# Patient Record
Sex: MALE | URBAN - METROPOLITAN AREA
[De-identification: names, ages, dates, MRNs, and addresses within clinical notes are randomized per-mention and may not be internally consistent; named-entity substitution may affect disease eponyms.]

---

## 2023-12-18 ENCOUNTER — PHARMACY VISIT (OUTPATIENT)
Dept: PHARMACY | Facility: CLINIC | Age: 28
End: 2023-12-18
Payer: MEDICARE

## 2023-12-18 PROCEDURE — RXMED WILLOW AMBULATORY MEDICATION CHARGE

## 2024-04-05 PROCEDURE — RXMED WILLOW AMBULATORY MEDICATION CHARGE

## 2024-04-06 ENCOUNTER — PHARMACY VISIT (OUTPATIENT)
Dept: PHARMACY | Facility: CLINIC | Age: 29
End: 2024-04-06
Payer: MEDICARE

## 2024-07-09 PROCEDURE — RXMED WILLOW AMBULATORY MEDICATION CHARGE

## 2024-07-11 ENCOUNTER — PHARMACY VISIT (OUTPATIENT)
Dept: PHARMACY | Facility: CLINIC | Age: 29
End: 2024-07-11
Payer: MEDICARE

## 2024-10-15 PROCEDURE — RXMED WILLOW AMBULATORY MEDICATION CHARGE

## 2024-10-17 ENCOUNTER — PHARMACY VISIT (OUTPATIENT)
Dept: PHARMACY | Facility: CLINIC | Age: 29
End: 2024-10-17
Payer: MEDICARE

## 2024-11-14 PROCEDURE — RXMED WILLOW AMBULATORY MEDICATION CHARGE

## 2024-11-15 ENCOUNTER — PHARMACY VISIT (OUTPATIENT)
Dept: PHARMACY | Facility: CLINIC | Age: 29
End: 2024-11-15
Payer: MEDICARE

## 2025-03-03 PROCEDURE — RXMED WILLOW AMBULATORY MEDICATION CHARGE

## 2025-03-04 ENCOUNTER — PHARMACY VISIT (OUTPATIENT)
Dept: PHARMACY | Facility: CLINIC | Age: 30
End: 2025-03-04
Payer: MEDICARE

## 2025-07-11 ENCOUNTER — PHARMACY VISIT (OUTPATIENT)
Dept: PHARMACY | Facility: CLINIC | Age: 30
End: 2025-07-11
Payer: MEDICARE

## 2025-07-11 ENCOUNTER — APPOINTMENT (OUTPATIENT)
Dept: BEHAVIORAL HEALTH | Facility: CLINIC | Age: 30
End: 2025-07-11
Payer: COMMERCIAL

## 2025-07-11 DIAGNOSIS — F32.5 MAJOR DEPRESSIVE DISORDER IN FULL REMISSION, UNSPECIFIED WHETHER RECURRENT: ICD-10-CM

## 2025-07-11 DIAGNOSIS — F41.1 GENERALIZED ANXIETY DISORDER: ICD-10-CM

## 2025-07-11 PROCEDURE — 99204 OFFICE O/P NEW MOD 45 MIN: CPT | Performed by: NURSE PRACTITIONER

## 2025-07-11 PROCEDURE — RXMED WILLOW AMBULATORY MEDICATION CHARGE

## 2025-07-11 RX ORDER — PROPRANOLOL HYDROCHLORIDE 10 MG/1
TABLET ORAL
Qty: 90 TABLET | Refills: 2 | Status: SHIPPED | OUTPATIENT
Start: 2025-07-11

## 2025-07-11 RX ORDER — FLUOXETINE 20 MG/1
20 CAPSULE ORAL DAILY
Qty: 90 CAPSULE | Refills: 3 | Status: SHIPPED | OUTPATIENT
Start: 2025-07-11 | End: 2026-07-11

## 2025-07-11 RX ORDER — FLUOXETINE HYDROCHLORIDE 40 MG/1
40 CAPSULE ORAL DAILY
Qty: 90 CAPSULE | Refills: 3 | Status: SHIPPED | OUTPATIENT
Start: 2025-07-11 | End: 2026-07-11

## 2025-07-11 ASSESSMENT — PATIENT HEALTH QUESTIONNAIRE - PHQ9
7. TROUBLE CONCENTRATING ON THINGS, SUCH AS READING THE NEWSPAPER OR WATCHING TELEVISION: NOT AT ALL
1. LITTLE INTEREST OR PLEASURE IN DOING THINGS: NOT AT ALL
5. POOR APPETITE OR OVEREATING: NOT AT ALL
3. TROUBLE FALLING OR STAYING ASLEEP: NOT AT ALL
9. THOUGHTS THAT YOU WOULD BE BETTER OFF DEAD, OR OF HURTING YOURSELF: NOT AT ALL
6. FEELING BAD ABOUT YOURSELF - OR THAT YOU ARE A FAILURE OR HAVE LET YOURSELF OR YOUR FAMILY DOWN: NOT AT ALL
SUM OF ALL RESPONSES TO PHQ9 QUESTIONS 1 & 2: 0
4. FEELING TIRED OR HAVING LITTLE ENERGY: NOT AT ALL
2. FEELING DOWN, DEPRESSED OR HOPELESS: NOT AT ALL
SUM OF ALL RESPONSES TO PHQ QUESTIONS 1-9: 0
8. MOVING OR SPEAKING SO SLOWLY THAT OTHER PEOPLE COULD HAVE NOTICED. OR THE OPPOSITE, BEING SO FIGETY OR RESTLESS THAT YOU HAVE BEEN MOVING AROUND A LOT MORE THAN USUAL: NOT AT ALL

## 2025-07-11 ASSESSMENT — ANXIETY QUESTIONNAIRES
GAD7 TOTAL SCORE: 0
1. FEELING NERVOUS, ANXIOUS, OR ON EDGE: NOT AT ALL
5. BEING SO RESTLESS THAT IT IS HARD TO SIT STILL: NOT AT ALL
6. BECOMING EASILY ANNOYED OR IRRITABLE: NOT AT ALL
2. NOT BEING ABLE TO STOP OR CONTROL WORRYING: NOT AT ALL
4. TROUBLE RELAXING: NOT AT ALL
3. WORRYING TOO MUCH ABOUT DIFFERENT THINGS: NOT AT ALL
7. FEELING AFRAID AS IF SOMETHING AWFUL MIGHT HAPPEN: NOT AT ALL
IF YOU CHECKED OFF ANY PROBLEMS ON THIS QUESTIONNAIRE, HOW DIFFICULT HAVE THESE PROBLEMS MADE IT FOR YOU TO DO YOUR WORK, TAKE CARE OF THINGS AT HOME, OR GET ALONG WITH OTHER PEOPLE: NOT DIFFICULT AT ALL

## 2025-07-11 NOTE — PROGRESS NOTES
"Time In: 0955  Time Out: 1018    An interactive audio and video telecommunication system which permits real time communications between the patient (at the originating site) and provider (at the distant site) was utilized to provide this telehealth service.   Verbal consent was requested and obtained from Driss Limon on this date, 07/11/25 for a telehealth visit and the patient's location was confirmed at the time of the visit.    The patient verified his date of birth, address and phone number.     Subjective   rDiss Limon, a 29 y.o. male, presenting to Psychiatry for evaluation and medication management     Preferred Name:  Driss    Reason for visit:  Medication management    Chief Complaint:  \"Those prescriptions have run out and I need to get them renewed.\"    Current Mood:  \"Good.\"    HPI  Driss Limon is a 29 y.o. male patient with a chief complaint of medication management presenting to outpatient treatment for a scheduled psych outpatient psychiatric evaluation.    The patient reports, \"Those prescriptions have run out and I need to get them renewed.\" He reports that he has been on Prozac since 2016 and at the current dose since 2023. He reports that he has done well on it. He reports that he has been on the propranolol since 2023 as well. He reports that he was a student at [employer] and was seeing a provider at school. He graduated in May 2025 with PhD in cancer biology. He reports that he was there for 7 years. 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 He reports that he is looking to move out of [place]. He reports that he only uses the propranolol occasionally, last time in February 2025 and then in October 2024.    The patient first started experiencing mental health symptoms around age 13. He reports that after 6th grade, he moved to middle school and it was a stressful time. He reports that the first few months he started having lost of appetite and " nausea related to stress. He reports that his home life was not the best, parents fighting a lot and  when he was in 10th grade. He reports that he has always stressed about social interactions in romantic relationships. He reports that he managed how he felt without treatment until 2016. The patient first started receiving mental health treatment in 2016. He reports that in 2016, he was a boyd in college, [employer]. He reports that his girlfriend at the time encouraged him to get help for what he was experiencing. He reports that he was down to about 100 lbs. He reports that he didn't feel like he fit in at the [employer]. He reports that his grades were not great. He reports that in Fall 2016, he started seeing a therapist and psychiatrist. He was started on Prozac which helped his symptoms. He denies any prior suicide attempts, psychiatric hospitalizations, self harm, or violence. He reports that he did have suicidal ideation in the past in high school between 2040-0271, then again in 2018 but nothing since. He reports that he would have passive thought of not wanting to go on living, no active plan or intent.         ADULT Psychiatric Review of Symptoms:  Anxiety: RUDY       RUDY: See HPI, diagnosed in 2016    OCD: Denies    Panic: Denies    PTSD: Denies    ADHD: Denies    Depression: See HPI, diagnosed in 2016    Delirium: Denies    Psychosis: Denies    Valarie: Denies    Safety Issues: Denies         HISTORY    Past Psychiatric History  Current diagnosis: anxiety  Current therapist: none, stopped when he graduated from [employer] in May 2025  Outpatient treatment history: started treatment in 2016 with therapy and medication   Inpatient treatment history: denies  Substance abuse treatment: denies  History of suicide attempts: denies  History of self-harm: denies  History of violence: denies  Current psychiatric medications: Prozac 60 mg daily, propranolol 10 mg as needed  Past  "psychiatric medications: hydroxyzine    Addiction/Substance Use  Alcohol use: between 2948-3591, \"I drank too much.\" Reports 5-6 drinks per week currently  Nicotine use: occasional tobacco pipe use  Drug use:    Opioids: denies   Cocaine: denies   Cannabis/Delta 8: history of use, stopped in March 2025   Methamphetamines: denies   Hallucinogens:  denies  Caffeine use: 1.5 cups of coffee daily      Social/Developmental History  Occupation: postdoctoral fellow at [employer]  Relationship status: never   Household members: lives with alone  Children: none  Siblings: 1 brother  Family relationships: brother and parents are main supports  Stressors: recently graduated from [employer] in May 2025, looking for job  Grade/school: graduated high school, PhD in cancer biology at [employer]  Learning problems/IEP: denies  Abuse/neglect history: denies   history: denies  Legal history: denies  Employment history: works full time  Interests/strengths: going to the gym, reading, watching movies and TV, video games  Guns in the home: denies      Family Psychiatric History  Mental Health: mother with anxiety, depression  Substance Abuse: denies  Suicide: denies       Medical History  -PCP: No primary care provider on file.  -TBI/head trauma/LOC/seizure hx: denies  -Medical: multiple kidney stones  -Surgical: denies       Falls  History of falls: No  Have you fallen in the past 12 months: No      High Blood pressure  No      Tobacco Cessation:  Do you use tobacco products: Yes  Do you want tobacco cessation treatment: No      Depression Screening:   PHQ9 score: 0  Plan: Medication and Follow up with this writer      Anxiety Screening:  RUDY-7 Score: 0  Plan: Medication and Follow up with this writer    Patient Health Questionnaire-9 Score: 0 (7/11/2025 10:07 AM)  RUDY-7 Total Score: 0 (7/11/2025 10:08 AM)       Record Review: brief      Mental Status Exam:  General appearance: Appears state age, appropriate eye contact, adequate grooming " "and hygiene  Attitude: Calm, cooperative, and engaged in conversation.  Behavior: Appropriate eye contact.   Motor Activity: No psychomotor agitation or retardation. No abnormal movements, tremors or tics. No evidence of extrapyramidal symptoms or tardive dyskinesia.  Speech: Regular rate, rhythm, volume. Spontaneous, no pressured speech.  Mood: \"Good.\"   Affect: Appropriate, full range, mood congruent.  Thought Process: Linear, logical, and goal-directed. No loose associations or gross thought disorganization.  Thought Content: Denied current suicidal ideation or thoughts of harm to self, denied homicidal ideation or thoughts of harm to others. No delusional thinking elicited. No perseverations or obsessions identified.   Perception: Did not endorse auditory or visual hallucinations, did not appear to be responding to hallucinatory stimuli.   Cognition: Alert, oriented x3. Preserved attention span and concentration, recent and remote memory. Adequate fund of knowledge. No deficits in language.   Insight: Good, in regards to understanding mental health condition  Judgement: Good         Review of Systems  Eyes  no discharge, no pain  Ears, Nose, Mouth, Throat  no pain, no rhinorrhea, no dysphagia  Resp  no dyspnea, no cough  GI  no nausea, vomiting, diarrhea    no urgency, no dysuria  Muskuloskeletal  no pain, no weakness  Integumentary  no rash  Endocrine   no polyurea  Hematologic  no bruising, no bleeding problems  CV  no palpitations, no pain  Pulm  no chest pain  Neuro  no weakness, no coordination problems, no dizziness  Constitutional  energy, appetite normal  Psychiatric  see psychiatric review of systems and HPI      OARRS:  Lady Duncan, APRN-CNP on 7/11/2025  9:53 AM  I have personally reviewed the OARRS report for Driss Limon. I have considered the risks of abuse, dependence, addiction and diversion        Vitals:  There were no vitals filed for this visit.        Current " Medications  Medications Ordered Prior to Encounter[1]       MEDICAL-DECISION MAKING  Moderate: 2 or more stable chronic illnesses with Prescription drug management         IMPRESSION  This is a 29-year-old male who presents for psychiatric evaluation for management of depression and anxiety.  The patient endorses symptoms that correlate with major depressive disorder in full remission and generalized anxiety disorder which is also well managed with his current treatment, Prozac 60 mg daily and propranolol as needed.  The patient has been on these current medications at the current doses since 2023 and finds them beneficial.  He would like to continue on them at this time.  The patient was previously receiving mental health treatment, both medication management and therapy, while attending [employer] but graduated in May 2025 and can no longer receive services through the [place].  He needs to establish care with a new provider to manage his medications.  He does not feel therapy is needed at this time as his symptoms are well managed.  He denies any current suicidal ideation but does have a history of suicidal thinking but none since 2018.  He has no prior suicide attempts or psychiatric hospitalizations.  He is help seeking and future oriented.  He has supports in the community.  He is agreeable to following up with this writer on 10/6/2025.       Diagnoses  Problem List Items Addressed This Visit    None  Visit Diagnoses         Generalized anxiety disorder        Relevant Medications    propranolol (Inderal) 10 mg tablet    FLUoxetine (PROzac) 20 mg capsule    FLUoxetine (PROzac) 40 mg capsule    Other Relevant Orders    Follow Up In Psychiatry      Major depressive disorder in full remission, unspecified whether recurrent        Relevant Medications    FLUoxetine (PROzac) 20 mg capsule    FLUoxetine (PROzac) 40 mg capsule    Other Relevant Orders    Follow Up In Psychiatry              SI/HI ASSESSMENT  -Risk Assessment: Driss Limon is currently a low acute risk of suicide and self-harm due to no past suicide attempt(s) and not currently endorsing thoughts of suicide. Driss Limon is currently a low acute risk of violence and harm to others due to no past history of violence and not currently threatening others.  -Suicidal Risk Factors: , male, and unmarried/single  -Violence Risk Factors: male  -Protective Factors: strong coping skills, sense of responsibility towards family, social support/connectedness, positive family relationships, hopefulness/future orientation, and employment  -Plan to Reduce Risk: Establish medication regimen, outpatient follow-up care, and increase coping skills .         Ford suicide severity rating scale  Suicidal and self-injurious behavior in the past 3 months: denies    Suicidal and self-injurious behavior in lifetime: Denies    Suicidal ideation (check most severe in past month): Denies    Activating events (recent): recently graduated, looking for a job    Treatment history: Previous psychiatric diagnosis and treatment and Current medications/treatment    Other risk factors: , Male, No children, and Not     Clinical status (recent): Denies    Protective factors (recent): Identifies reasons for living, responsibility to family or others, supportive social network or family, and engaged in work or school    Other protective factors: Age and Employed/in school    Describe any suicidal, self-injurious, or aggressive behavior (include dates): denies        PLAN  -Continue Prozac 60 mg by mouth daily for depression and anxiety; prescription sent for Prozac 40 mg by mouth daily, dispense 90 with 3 refills; prescription sent for Prozac 20 mg by mouth daily, dispense 90 with 3 refills  -Continue propranolol 10 mg - 20 mg by mouth 3 times daily as needed for performance anxiety; prescription sent for propranolol 10 mg tablets, take 1-2  tablets by mouth 3 times daily as needed for [place] performance anxiety, dispense 90 with 2 refills  -Follow-up with this provider on 10/6/2025  -Risks/benefits/assessment of medication interventions discussed with pt; pt agreeable to plan. Will continue to monitor for symptoms management and side effects and adjust plan as needed.  -Motivational interviewing to increase coping skills/behavior regulation.  -Safety plan reviewed.  -Call  Psychiatry at [phone] or communicate through [employer] with issues .   -For [facility], Parents R People is a 24/7 hotline you can call for assistance at [phone] [phone]. Please call 161 or go to your closest Emergency Room if you feel worse. This includes thoughts of hurting yourself or anyone else, or having other troubles such as hearing voices, seeing visions, or having new and scary thoughts about the people around you.      Review with patient: Treatment plan reviewed with the patient.  Medication risks/benefit reviewed with the patient      Time Spent:    Prep time: 2 minutes  Direct patient time: 23 minutes  Documentation time: 9 minutes  Total time: 34 minutes    Lady Cantu, APRN-CNP         [1]   Current Outpatient Medications on File Prior to Visit   Medication Sig Dispense Refill    [DISCONTINUED] FLUoxetine (PROzac) 20 mg capsule Take 1 capsule (20 mg) by mouth once daily alongside 40mg dose for a total dose of 60mg daily 90 capsule 1    [DISCONTINUED] FLUoxetine (PROzac) 40 mg capsule Take 1 capsule (40 mg) by mouth once daily alongside 20mg dose for a total dose of 60mg daily 90 capsule 1    [DISCONTINUED] propranolol (Inderal) 10 mg tablet TAKE 1-2 PILLS EVERY 6 HOURS AS NEEDED FOR PERFORMANCE ANXIETY- TAKE 30 MINUTES AHEAD 60 tablet 5    [DISCONTINUED] FLUoxetine (PROzac) 20 mg capsule TAKE 1 CAPSULE BY MOUTH ONCE DAILY FOR TOTAL DOSE OF 60MG DAILY 90 capsule 1    [DISCONTINUED] FLUoxetine (PROzac) 20 mg capsule TAKE 1 CAPSULE ORALLY DAILY- FOR  TOTAL DOSE OF 60 MG DAILY 90 capsule 1    [DISCONTINUED] FLUoxetine (PROzac) 40 mg capsule TAKE 1 CAPSULE BY MOUTH ONCE DAILY FOR TOTAL OF 60MG DAILY 90 capsule 1    [DISCONTINUED] FLUoxetine (PROzac) 40 mg capsule TAKE 1 CAPSULE ORALLY DAILY FOR TOTAL OF 60 MG DAILY 90 capsule 1    [DISCONTINUED] propranolol (Inderal) 10 mg tablet TAKE 1-2 TABLETS BY MOUTH EVERY 6 HOURS, AS NEEDED FOR PERFORMANCE ANXIETY- TAKE 30 MINUTES AHEAD 60 tablet 3     No current facility-administered medications on file prior to visit.

## 2025-10-06 ENCOUNTER — APPOINTMENT (OUTPATIENT)
Dept: BEHAVIORAL HEALTH | Facility: CLINIC | Age: 30
End: 2025-10-06
Payer: COMMERCIAL